# Patient Record
Sex: FEMALE | Race: WHITE | NOT HISPANIC OR LATINO | Employment: OTHER | URBAN - NONMETROPOLITAN AREA
[De-identification: names, ages, dates, MRNs, and addresses within clinical notes are randomized per-mention and may not be internally consistent; named-entity substitution may affect disease eponyms.]

---

## 2023-05-11 ENCOUNTER — APPOINTMENT (OUTPATIENT)
Dept: PRIMARY CARE | Facility: CLINIC | Age: 88
End: 2023-05-11
Payer: MEDICARE

## 2023-05-31 LAB
ANION GAP IN SER/PLAS: 12 MMOL/L (ref 10–20)
CALCIUM (MG/DL) IN SER/PLAS: 10.2 MG/DL (ref 8.6–10.3)
CARBON DIOXIDE, TOTAL (MMOL/L) IN SER/PLAS: 27 MMOL/L (ref 21–32)
CHLORIDE (MMOL/L) IN SER/PLAS: 106 MMOL/L (ref 98–107)
CHOLESTEROL (MG/DL) IN SER/PLAS: 229 MG/DL (ref 0–199)
CHOLESTEROL IN HDL (MG/DL) IN SER/PLAS: 46 MG/DL
CHOLESTEROL/HDL RATIO: 5
CREATININE (MG/DL) IN SER/PLAS: 1 MG/DL (ref 0.5–1.05)
ESTIMATED AVERAGE GLUCOSE FOR HBA1C: 114 MG/DL
GFR FEMALE: 52 ML/MIN/1.73M2
GLUCOSE (MG/DL) IN SER/PLAS: 99 MG/DL (ref 74–99)
HEMOGLOBIN A1C/HEMOGLOBIN TOTAL IN BLOOD: 5.6 %
LDL: 122 MG/DL (ref 0–99)
NON HDL CHOLESTEROL: 183 MG/DL
POTASSIUM (MMOL/L) IN SER/PLAS: 4.4 MMOL/L (ref 3.5–5.3)
SODIUM (MMOL/L) IN SER/PLAS: 141 MMOL/L (ref 136–145)
TRIGLYCERIDE (MG/DL) IN SER/PLAS: 306 MG/DL (ref 0–149)
UREA NITROGEN (MG/DL) IN SER/PLAS: 22 MG/DL (ref 6–23)
VLDL: 61 MG/DL (ref 0–40)

## 2023-06-02 ENCOUNTER — OFFICE VISIT (OUTPATIENT)
Dept: PRIMARY CARE | Facility: CLINIC | Age: 88
End: 2023-06-02
Payer: MEDICARE

## 2023-06-02 VITALS
SYSTOLIC BLOOD PRESSURE: 124 MMHG | OXYGEN SATURATION: 98 % | HEART RATE: 78 BPM | BODY MASS INDEX: 32.2 KG/M2 | DIASTOLIC BLOOD PRESSURE: 82 MMHG | HEIGHT: 60 IN | WEIGHT: 164 LBS

## 2023-06-02 DIAGNOSIS — J01.90 ACUTE NON-RECURRENT SINUSITIS, UNSPECIFIED LOCATION: Primary | ICD-10-CM

## 2023-06-02 PROCEDURE — 1159F MED LIST DOCD IN RCRD: CPT | Performed by: FAMILY MEDICINE

## 2023-06-02 PROCEDURE — 1036F TOBACCO NON-USER: CPT | Performed by: FAMILY MEDICINE

## 2023-06-02 PROCEDURE — 99213 OFFICE O/P EST LOW 20 MIN: CPT | Performed by: FAMILY MEDICINE

## 2023-06-02 PROCEDURE — 1160F RVW MEDS BY RX/DR IN RCRD: CPT | Performed by: FAMILY MEDICINE

## 2023-06-02 RX ORDER — SIMVASTATIN 40 MG/1
1 TABLET, FILM COATED ORAL DAILY
COMMUNITY
Start: 2019-12-12 | End: 2024-01-03

## 2023-06-02 RX ORDER — AMOXICILLIN 875 MG/1
875 TABLET, FILM COATED ORAL 2 TIMES DAILY
Qty: 20 TABLET | Refills: 0 | Status: SHIPPED | OUTPATIENT
Start: 2023-06-02 | End: 2023-06-12

## 2023-06-02 RX ORDER — ALLOPURINOL 100 MG/1
1 TABLET ORAL DAILY
COMMUNITY
Start: 2020-02-18 | End: 2023-08-09 | Stop reason: SDUPTHER

## 2023-06-02 RX ORDER — MULTIVITAMIN
1 TABLET ORAL DAILY
COMMUNITY
End: 2024-06-05 | Stop reason: WASHOUT

## 2023-06-02 NOTE — PATIENT INSTRUCTIONS
Will treat with antibiotics for sinus infection.  Follow up with Dr Abad next week as scheduled.

## 2023-06-02 NOTE — PROGRESS NOTES
Subjective   Kelly Borrero is a 89 y.o. female who presents for Sinusitis (Plugged head/ears/greenish/yellow discharge. Been going on for a couple weeks. Has used mucinex and coricidin. ).  Pt of Dr Abad here c/o of sinus issues for the past couple of weeks.  Congestion, drainage, discharge, OTC medications not helpful.  She states that she has sinus issues all year long but got worse a couple of weeks ago - was clear at first and started to get better but then got worse again.  Has yellow/green drainage.  Is getting worse instead of better.              Objective   Visit Vitals  /82   Pulse 78      Physical Exam  Vitals reviewed.   Constitutional:       General: She is not in acute distress.  HENT:      Right Ear: Tympanic membrane normal.      Left Ear: Tympanic membrane normal.      Mouth/Throat:      Mouth: Mucous membranes are moist.      Comments: Mild erythema, no exudate  Cardiovascular:      Rate and Rhythm: Normal rate and regular rhythm.      Heart sounds: No murmur heard.  Pulmonary:      Effort: Pulmonary effort is normal. No respiratory distress.      Breath sounds: Normal breath sounds.   Skin:     General: Skin is warm and dry.   Neurological:      General: No focal deficit present.      Mental Status: She is alert. Mental status is at baseline.         Assessment/Plan   Problem List Items Addressed This Visit    None  Visit Diagnoses       Acute non-recurrent sinusitis, unspecified location    -  Primary    Relevant Medications    amoxicillin (Amoxil) 875 mg tablet               Ale Murrieta MD

## 2023-06-07 ENCOUNTER — OFFICE VISIT (OUTPATIENT)
Dept: PRIMARY CARE | Facility: CLINIC | Age: 88
End: 2023-06-07
Payer: MEDICARE

## 2023-06-07 VITALS
OXYGEN SATURATION: 96 % | HEART RATE: 58 BPM | HEIGHT: 60 IN | WEIGHT: 163 LBS | SYSTOLIC BLOOD PRESSURE: 126 MMHG | DIASTOLIC BLOOD PRESSURE: 60 MMHG | BODY MASS INDEX: 32 KG/M2

## 2023-06-07 DIAGNOSIS — I15.9 SECONDARY HYPERTENSION: ICD-10-CM

## 2023-06-07 DIAGNOSIS — Z00.00 MEDICARE ANNUAL WELLNESS VISIT, SUBSEQUENT: Primary | ICD-10-CM

## 2023-06-07 DIAGNOSIS — N18.31 STAGE 3A CHRONIC KIDNEY DISEASE (MULTI): ICD-10-CM

## 2023-06-07 DIAGNOSIS — I70.1 RENAL ARTERY STENOSIS (CMS-HCC): ICD-10-CM

## 2023-06-07 DIAGNOSIS — E78.2 MIXED HYPERLIPIDEMIA: ICD-10-CM

## 2023-06-07 DIAGNOSIS — R73.9 HYPERGLYCEMIA: ICD-10-CM

## 2023-06-07 PROBLEM — K57.90 DIVERTICULOSIS: Status: ACTIVE | Noted: 2023-06-07

## 2023-06-07 PROBLEM — E66.9 OBESITY: Status: ACTIVE | Noted: 2023-06-07

## 2023-06-07 PROBLEM — E78.5 HYPERLIPIDEMIA: Status: ACTIVE | Noted: 2023-06-07

## 2023-06-07 PROBLEM — M1A.9XX0 GOUT, CHRONIC: Status: ACTIVE | Noted: 2023-06-07

## 2023-06-07 PROBLEM — H40.9 GLAUCOMA: Status: ACTIVE | Noted: 2023-06-07

## 2023-06-07 PROBLEM — I10 HTN (HYPERTENSION): Status: ACTIVE | Noted: 2023-06-07

## 2023-06-07 PROBLEM — K21.9 GERD (GASTROESOPHAGEAL REFLUX DISEASE): Status: ACTIVE | Noted: 2023-06-07

## 2023-06-07 PROCEDURE — 1159F MED LIST DOCD IN RCRD: CPT | Performed by: INTERNAL MEDICINE

## 2023-06-07 PROCEDURE — 1160F RVW MEDS BY RX/DR IN RCRD: CPT | Performed by: INTERNAL MEDICINE

## 2023-06-07 PROCEDURE — G0439 PPPS, SUBSEQ VISIT: HCPCS | Performed by: INTERNAL MEDICINE

## 2023-06-07 PROCEDURE — 1036F TOBACCO NON-USER: CPT | Performed by: INTERNAL MEDICINE

## 2023-06-07 PROCEDURE — 1170F FXNL STATUS ASSESSED: CPT | Performed by: INTERNAL MEDICINE

## 2023-06-07 PROCEDURE — 3078F DIAST BP <80 MM HG: CPT | Performed by: INTERNAL MEDICINE

## 2023-06-07 PROCEDURE — 99214 OFFICE O/P EST MOD 30 MIN: CPT | Performed by: INTERNAL MEDICINE

## 2023-06-07 PROCEDURE — 3074F SYST BP LT 130 MM HG: CPT | Performed by: INTERNAL MEDICINE

## 2023-06-07 ASSESSMENT — ACTIVITIES OF DAILY LIVING (ADL)
BATHING: INDEPENDENT
GROCERY_SHOPPING: INDEPENDENT
MANAGING_FINANCES: INDEPENDENT
DRESSING: INDEPENDENT
TAKING_MEDICATION: INDEPENDENT
DOING_HOUSEWORK: INDEPENDENT

## 2023-06-07 ASSESSMENT — PATIENT HEALTH QUESTIONNAIRE - PHQ9
SUM OF ALL RESPONSES TO PHQ9 QUESTIONS 1 AND 2: 0
2. FEELING DOWN, DEPRESSED OR HOPELESS: NOT AT ALL
1. LITTLE INTEREST OR PLEASURE IN DOING THINGS: NOT AT ALL

## 2023-06-07 NOTE — ASSESSMENT & PLAN NOTE
-Her cholesterol level is not ideal but I am not adding any medication at age 89 and I am not even going to put her on any severe dietary restrictions

## 2023-06-07 NOTE — PROGRESS NOTES
Pt is here today for a 6 month check up, she is also due for a [facility] wellness exam. C/O sinus congestion.

## 2023-06-07 NOTE — PATIENT INSTRUCTIONS
For your respiratory symptoms please continue taking your antibiotic as prescribed and also use your over-the-counter Flonase nasal spray.  If your condition does not resolve please get in contact with me  Overall I am pleased with your test results and I think you are doing wonderful for your age.  Please be very careful about preventing falls and use your cane at all times  If everything goes according to plan I will see you back in 6 months for reevaluation and please remember to get lab work prior to that visit

## 2023-06-07 NOTE — PROGRESS NOTES
Subjective   Reason for Visit: Kelly Borrero is an 89 y.o. female here for a Medicare Wellness visit.     Past Medical, Surgical, and Family History reviewed and updated in chart.    Reviewed all medications by prescribing practitioner or clinical pharmacist (such as prescriptions, OTCs, herbal therapies and supplements) and documented in the medical record.    HPI  She is here today for her general checkup and we also completed her annual Medicare wellness visit.  Recently she was seen during my absence for a respiratory infection and prescribed antibiotic therapy.  She states that she feels like she has pressure in her ears.  She does wear hearing aids but today she is having difficulties hearing.  I did examine her ear canals and there is no cerumen obstruction.  She states she is feeling better with the antibiotic and will take it to completion.  I also remind her to use her Flonase nasal spray.  We did conduct a review of systems and we also went through the Medicare questionnaire.  She just enjoyed her 89 birthday yesterday and is still living quite independently with her .  She reports no falls in the recent past and she has taken measures to prevent falls in her home.  Today she comes in with a cane.  She states she is not feeling depressed.  She states she feels like her memory is good for her age and in fact she feels like her health is very good for her age.  She also reports having a well-balanced diet and she does drink caffeine beverages.  She tries to stay active but does not exercise regularly.  She also has advanced directives.  We also reviewed her most recent laboratory test results and for the most part and pleased with her numbers.  We talked about her borderline elevated blood sugar and her cholesterol.  Her kidney function has remained stable.  We will make no changes in medications and if everything goes according to plan we will see her back in 6 months  Patient Care  Team:  Leslye Abad DO as PCP - General  Leslye Abad DO as PCP - [employer] PCP     Review of Systems    Objective   Vitals:  /60   Pulse 58   Ht 1.524 m (5')   Wt 73.9 kg (163 lb)   SpO2 96%   BMI 31.83 kg/m²       Physical Exam  Vitals and nursing note reviewed.   Constitutional:       General: She is not in acute distress.     Appearance: Normal appearance.   HENT:      Head: Normocephalic and atraumatic.      Comments: Very hard of hearing  Eyes:      Conjunctiva/sclera: Conjunctivae normal.   Cardiovascular:      Rate and Rhythm: Normal rate and regular rhythm.      Heart sounds: Normal heart sounds.   Pulmonary:      Effort: No respiratory distress.      Breath sounds: No wheezing.   Abdominal:      Palpations: Abdomen is soft.      Tenderness: There is no abdominal tenderness. There is no guarding.   Musculoskeletal:         General: No swelling. Normal range of motion.   Skin:     General: Skin is warm and dry.   Neurological:      General: No focal deficit present.      Mental Status: She is alert and oriented to person, place, and time.   Psychiatric:         Behavior: Behavior normal.       Recent Results (from the past 672 hour(s))   Basic Metabolic Panel    Collection Time: 05/31/23  7:41 AM   Result Value Ref Range    Glucose 99 74 - 99 mg/dL    Sodium 141 136 - 145 mmol/L    Potassium 4.4 3.5 - 5.3 mmol/L    Chloride 106 98 - 107 mmol/L    Bicarbonate 27 21 - 32 mmol/L    Anion Gap 12 10 - 20 mmol/L    Urea Nitrogen 22 6 - 23 mg/dL    Creatinine 1.00 0.50 - 1.05 mg/dL    GFR Female 52 (A) >90 mL/min/1.73m2    Calcium 10.2 8.6 - 10.3 mg/dL   Lipid Panel    Collection Time: 05/31/23  7:41 AM   Result Value Ref Range    Cholesterol 229 (H) 0 - 199 mg/dL    HDL 46.0 mg/dL    Cholesterol/HDL Ratio 5.0      (H) 0 - 99 mg/dL    VLDL 61 (H) 0 - 40 mg/dL    Triglycerides 306 (H) 0 - 149 mg/dL    Non HDL Cholesterol 183 mg/dL   Hemoglobin A1C    Collection Time:  05/31/23  7:41 AM   Result Value Ref Range    Hemoglobin A1C 5.6 %    Estimated Average Glucose 114 MG/DL         Assessment/Plan   Problem List Items Addressed This Visit          Circulatory    Renal artery stenosis (CMS/HCC)     -Stable         HTN (hypertension)     -Currently well controlled  -She is currently on no medication for blood pressure            Genitourinary    Stage 3a chronic kidney disease     -Kidney function is very stable at age 89         Relevant Orders    Follow Up In Primary Care    Basic Metabolic Panel       Other    Hyperglycemia     -Blood glucose level is borderline and we will continue to monitor         Hyperlipidemia     -Her cholesterol level is not ideal but I am not adding any medication at age 89 and I am not even going to put her on any severe dietary restrictions         Medicare annual wellness visit, subsequent - Primary

## 2023-08-09 DIAGNOSIS — M10.9 GOUT, UNSPECIFIED CAUSE, UNSPECIFIED CHRONICITY, UNSPECIFIED SITE: ICD-10-CM

## 2023-08-09 RX ORDER — ALLOPURINOL 100 MG/1
100 TABLET ORAL DAILY
Qty: 90 TABLET | Refills: 1 | Status: SHIPPED | OUTPATIENT
Start: 2023-08-09 | End: 2023-10-16

## 2023-09-18 ENCOUNTER — TELEPHONE (OUTPATIENT)
Dept: PRIMARY CARE | Facility: CLINIC | Age: 88
End: 2023-09-18
Payer: MEDICARE

## 2023-09-19 ENCOUNTER — OFFICE VISIT (OUTPATIENT)
Dept: PRIMARY CARE | Facility: CLINIC | Age: 88
End: 2023-09-19
Payer: MEDICARE

## 2023-09-19 VITALS
WEIGHT: 165 LBS | HEIGHT: 60 IN | BODY MASS INDEX: 32.39 KG/M2 | SYSTOLIC BLOOD PRESSURE: 136 MMHG | OXYGEN SATURATION: 98 % | HEART RATE: 82 BPM | DIASTOLIC BLOOD PRESSURE: 84 MMHG

## 2023-09-19 DIAGNOSIS — M62.830 SPASM OF MUSCLE OF LOWER BACK: Primary | ICD-10-CM

## 2023-09-19 PROCEDURE — 1160F RVW MEDS BY RX/DR IN RCRD: CPT | Performed by: INTERNAL MEDICINE

## 2023-09-19 PROCEDURE — 3079F DIAST BP 80-89 MM HG: CPT | Performed by: INTERNAL MEDICINE

## 2023-09-19 PROCEDURE — 3075F SYST BP GE 130 - 139MM HG: CPT | Performed by: INTERNAL MEDICINE

## 2023-09-19 PROCEDURE — 99213 OFFICE O/P EST LOW 20 MIN: CPT | Performed by: INTERNAL MEDICINE

## 2023-09-19 PROCEDURE — 1159F MED LIST DOCD IN RCRD: CPT | Performed by: INTERNAL MEDICINE

## 2023-09-19 PROCEDURE — 1036F TOBACCO NON-USER: CPT | Performed by: INTERNAL MEDICINE

## 2023-09-19 RX ORDER — TIMOLOL MALEATE 5 MG/ML
SOLUTION/ DROPS OPHTHALMIC
COMMUNITY
Start: 2023-08-15

## 2023-09-19 ASSESSMENT — ENCOUNTER SYMPTOMS
VOMITING: 0
NAUSEA: 0
COUGH: 0
SHORTNESS OF BREATH: 0
FATIGUE: 0
PALPITATIONS: 0
BLOOD IN STOOL: 0
ABDOMINAL PAIN: 0
WHEEZING: 0
BACK PAIN: 1
DIARRHEA: 0

## 2023-09-19 NOTE — PATIENT INSTRUCTIONS
Your blood pressure when you arrived today was elevated but after sitting for a while it did come down.  I got 136/84.  I am so glad that you stopped and the other day for blood pressure check as well and your blood pressure was doing well at that time.  I think that when you get upset your blood pressure goes up or when you walk in from the parking lot to the office sometimes blood pressure will go up.  We will continue to monitor  I am making no changes in your therapy and if you get your flu vaccine before we see you next time please let us know  We will see you back as previously planned

## 2023-09-19 NOTE — PROGRESS NOTES
Subjective   Patient ID: Kelly Borrero is a 89 y.o. female who presents for No chief complaint on file..  HPI  She is here today for evaluation as she had a recent visit to [facility] emergency department because of some left-sided flank pain.  She went there on the 13th and she had several tests including EKG, chest x-ray, x-ray of the ribs, and multiple laboratory test.  I was unable to access the exact results of these but she brought in a summary of her visit.  She states she was advised that everything looked fine and that she was simply suffering from back spasms.  She states she is feeling a lot better now and is doing okay.  We did conduct a review of systems.  We also discussed her blood pressure.  She states that she had called out here to have an appointment on Monday and that her  was advised that she had an appointment at 10 AM but when she got here she was advised that she did not have an appointment and while talking about it today it made her upset.  After sitting for a while her blood pressure did come down nicely.  She also stopped in the other day for a blood pressure checkup and it was fine.  I rechecked it now and it did go down.  We are making no changes in therapy because overall she seems to be getting along okay and she does have a regular visit coming up in a month or 2.  Review of Systems   Constitutional:  Negative for fatigue.   Respiratory:  Negative for cough, shortness of breath and wheezing.    Cardiovascular:  Negative for chest pain, palpitations and leg swelling.   Gastrointestinal:  Negative for abdominal pain, blood in stool, diarrhea, nausea and vomiting.   Musculoskeletal:  Positive for back pain.     Objective   Physical Exam  Vitals and nursing note reviewed.   Constitutional:       General: She is not in acute distress.     Appearance: Normal appearance.   HENT:      Head: Normocephalic and atraumatic.   Eyes:      Conjunctiva/sclera: Conjunctivae normal.    Cardiovascular:      Rate and Rhythm: Normal rate and regular rhythm.      Heart sounds: Normal heart sounds.   Pulmonary:      Effort: No respiratory distress.      Breath sounds: No wheezing.   Abdominal:      Palpations: Abdomen is soft.      Tenderness: There is no abdominal tenderness. There is no guarding.   Musculoskeletal:         General: No swelling. Normal range of motion.   Skin:     General: Skin is warm and dry.   Neurological:      General: No focal deficit present.      Mental Status: She is alert and oriented to person, place, and time.   Psychiatric:         Behavior: Behavior normal.       Assessment/Plan   Problem List Items Addressed This Visit       Spasm of muscle of lower back - Primary     -Reports feeling better now and on the way to resolution of her symptoms  -Recent ER visit at [facility] on September 13  -Several tests were performed including lab work, EKG, and x-ray of ribs as well as chest x-ray  -She will return as previously planned to go over everything else               Leslye Abad, DO

## 2023-09-19 NOTE — ASSESSMENT & PLAN NOTE
-Reports feeling better now and on the way to resolution of her symptoms  -Recent ER visit at [facility] on September 13  -Several tests were performed including lab work, EKG, and x-ray of ribs as well as chest x-ray  -She will return as previously planned to go over everything else

## 2023-10-13 DIAGNOSIS — M10.9 GOUT, UNSPECIFIED CAUSE, UNSPECIFIED CHRONICITY, UNSPECIFIED SITE: ICD-10-CM

## 2023-10-16 RX ORDER — ALLOPURINOL 100 MG/1
100 TABLET ORAL DAILY
Qty: 90 TABLET | Refills: 1 | Status: SHIPPED | OUTPATIENT
Start: 2023-10-16 | End: 2023-12-27

## 2023-11-30 ENCOUNTER — LAB (OUTPATIENT)
Dept: LAB | Facility: LAB | Age: 88
End: 2023-11-30
Payer: MEDICARE

## 2023-11-30 DIAGNOSIS — N18.31 STAGE 3A CHRONIC KIDNEY DISEASE (MULTI): ICD-10-CM

## 2023-11-30 LAB
ANION GAP SERPL CALC-SCNC: 11 MMOL/L (ref 10–20)
BUN SERPL-MCNC: 35 MG/DL (ref 6–23)
CALCIUM SERPL-MCNC: 9.8 MG/DL (ref 8.6–10.3)
CHLORIDE SERPL-SCNC: 105 MMOL/L (ref 98–107)
CO2 SERPL-SCNC: 28 MMOL/L (ref 21–32)
CREAT SERPL-MCNC: 1.12 MG/DL (ref 0.5–1.05)
GFR SERPL CREATININE-BSD FRML MDRD: 45 ML/MIN/1.73M*2
GLUCOSE SERPL-MCNC: 95 MG/DL (ref 74–99)
POTASSIUM SERPL-SCNC: 4.4 MMOL/L (ref 3.5–5.3)
SODIUM SERPL-SCNC: 140 MMOL/L (ref 136–145)

## 2023-11-30 PROCEDURE — 80048 BASIC METABOLIC PNL TOTAL CA: CPT

## 2023-11-30 PROCEDURE — 36415 COLL VENOUS BLD VENIPUNCTURE: CPT

## 2023-12-06 ENCOUNTER — OFFICE VISIT (OUTPATIENT)
Dept: PRIMARY CARE | Facility: CLINIC | Age: 88
End: 2023-12-06
Payer: MEDICARE

## 2023-12-06 VITALS
SYSTOLIC BLOOD PRESSURE: 122 MMHG | BODY MASS INDEX: 31.83 KG/M2 | DIASTOLIC BLOOD PRESSURE: 80 MMHG | HEART RATE: 76 BPM | OXYGEN SATURATION: 98 % | WEIGHT: 163 LBS

## 2023-12-06 DIAGNOSIS — E78.2 MIXED HYPERLIPIDEMIA: ICD-10-CM

## 2023-12-06 DIAGNOSIS — M1A.9XX0 CHRONIC GOUT WITHOUT TOPHUS, UNSPECIFIED CAUSE, UNSPECIFIED SITE: ICD-10-CM

## 2023-12-06 DIAGNOSIS — I15.9 SECONDARY HYPERTENSION: Primary | ICD-10-CM

## 2023-12-06 DIAGNOSIS — N18.31 STAGE 3A CHRONIC KIDNEY DISEASE (MULTI): ICD-10-CM

## 2023-12-06 PROBLEM — Z00.00 MEDICARE ANNUAL WELLNESS VISIT, SUBSEQUENT: Status: RESOLVED | Noted: 2023-06-07 | Resolved: 2023-12-06

## 2023-12-06 PROBLEM — M62.830 SPASM OF MUSCLE OF LOWER BACK: Status: RESOLVED | Noted: 2023-09-19 | Resolved: 2023-12-06

## 2023-12-06 PROCEDURE — 1160F RVW MEDS BY RX/DR IN RCRD: CPT | Performed by: INTERNAL MEDICINE

## 2023-12-06 PROCEDURE — 3074F SYST BP LT 130 MM HG: CPT | Performed by: INTERNAL MEDICINE

## 2023-12-06 PROCEDURE — 99214 OFFICE O/P EST MOD 30 MIN: CPT | Performed by: INTERNAL MEDICINE

## 2023-12-06 PROCEDURE — 1036F TOBACCO NON-USER: CPT | Performed by: INTERNAL MEDICINE

## 2023-12-06 PROCEDURE — 3079F DIAST BP 80-89 MM HG: CPT | Performed by: INTERNAL MEDICINE

## 2023-12-06 PROCEDURE — 1159F MED LIST DOCD IN RCRD: CPT | Performed by: INTERNAL MEDICINE

## 2023-12-06 ASSESSMENT — ENCOUNTER SYMPTOMS
ARTHRALGIAS: 0
ROS GI COMMENTS: OCC HEARTBURN
ABDOMINAL PAIN: 0
PALPITATIONS: 0
BACK PAIN: 0
BLOOD IN STOOL: 0
DIARRHEA: 0
FATIGUE: 1
NAUSEA: 0
SHORTNESS OF BREATH: 0
VOMITING: 0
WHEEZING: 0
COUGH: 0

## 2023-12-06 ASSESSMENT — PATIENT HEALTH QUESTIONNAIRE - PHQ9
2. FEELING DOWN, DEPRESSED OR HOPELESS: NOT AT ALL
SUM OF ALL RESPONSES TO PHQ9 QUESTIONS 1 AND 2: 0
2. FEELING DOWN, DEPRESSED OR HOPELESS: NOT AT ALL
1. LITTLE INTEREST OR PLEASURE IN DOING THINGS: NOT AT ALL
1. LITTLE INTEREST OR PLEASURE IN DOING THINGS: NOT AT ALL
SUM OF ALL RESPONSES TO PHQ9 QUESTIONS 1 AND 2: 0

## 2023-12-06 NOTE — ASSESSMENT & PLAN NOTE
-Kidney function remains stable and we will continue to monitor  -We encourage good hydration and to avoid over-the-counter NSAIDs   Yes

## 2023-12-06 NOTE — PROGRESS NOTES
Subjective   Patient ID: Kelly Borrero is a 89 y.o. female who presents for Follow-up (6 MO FUV).  HPI  She is here today for her routine next month checkup.  She is 89-1/2 years old and doing splendidly.  She is looking great and she reports feeling well with exception of occasional fatigue.  We did conduct a full review of systems and we also reviewed her medications.  We talked about refills but she indicated she has several refills ahead of her and would like to wait until its time for her to get a renewal.  We also reviewed her most recent laboratory test results talked about her fluctuating labs for her kidneys.  It appears to be stable.  We also talked about falls and she has had no falls in the last year.  She does use her cane to get around.  She is a little bit hard of hearing but otherwise I feel she is doing remarkably well.  The family goes according to plan we will see her back in 6 months for reevaluation and she knows to get lab work done prior to that visit.  Review of Systems   Constitutional:  Positive for fatigue.   Respiratory:  Negative for cough, shortness of breath and wheezing.    Cardiovascular:  Negative for chest pain, palpitations and leg swelling.   Gastrointestinal:  Negative for abdominal pain, blood in stool, diarrhea, nausea and vomiting.        Occ heartburn   Musculoskeletal:  Negative for arthralgias and back pain.     Objective   Physical Exam  Vitals and nursing note reviewed.   Constitutional:       General: She is not in acute distress.     Appearance: Normal appearance.   HENT:      Head: Normocephalic and atraumatic.   Eyes:      Conjunctiva/sclera: Conjunctivae normal.   Cardiovascular:      Rate and Rhythm: Normal rate and regular rhythm.      Heart sounds: Normal heart sounds.   Pulmonary:      Effort: No respiratory distress.      Breath sounds: No wheezing.   Abdominal:      Palpations: Abdomen is soft.      Tenderness: There is no abdominal tenderness. There is no  guarding.   Musculoskeletal:         General: No swelling. Normal range of motion.   Skin:     General: Skin is warm and dry.   Neurological:      General: No focal deficit present.      Mental Status: She is alert and oriented to person, place, and time.   Psychiatric:         Behavior: Behavior normal.       Recent Results (from the past 672 hour(s))   Basic Metabolic Panel    Collection Time: 11/30/23  7:35 AM   Result Value Ref Range    Glucose 95 74 - 99 mg/dL    Sodium 140 136 - 145 mmol/L    Potassium 4.4 3.5 - 5.3 mmol/L    Chloride 105 98 - 107 mmol/L    Bicarbonate 28 21 - 32 mmol/L    Anion Gap 11 10 - 20 mmol/L    Urea Nitrogen 35 (H) 6 - 23 mg/dL    Creatinine 1.12 (H) 0.50 - 1.05 mg/dL    eGFR 45 (L) >60 mL/min/1.73m*2    Calcium 9.8 8.6 - 10.3 mg/dL       Assessment/Plan   Problem List Items Addressed This Visit             ICD-10-CM    Stage 3a chronic kidney disease (CMS/HCC) N18.31     -Kidney function remains stable and we will continue to monitor  -We encourage good hydration and to avoid over-the-counter NSAIDs         Relevant Orders    Basic Metabolic Panel    Gout, chronic [id number].[id number]     -She has no current symptoms of gout and will continue with allopurinol 100 mg daily         HTN (hypertension) - Primary I10     -Blood pressure is excellent today and we will continue to monitor         Hyperlipidemia E78.5     -She continues to take simvastatin 40 mg daily  -We will check a fasting lipid profile just prior to her next follow-up visit         Relevant Orders    Lipid Panel          Leslye Abad DO

## 2023-12-06 NOTE — ASSESSMENT & PLAN NOTE
-She continues to take simvastatin 40 mg daily  -We will check a fasting lipid profile just prior to her next follow-up visit

## 2023-12-06 NOTE — PATIENT INSTRUCTIONS
As we discussed I am very pleased with how well you are doing at this time and please do not hesitate to reach out if you have any questions or concerns.  We would like to see you back in approximately 6 months and just prior to that visit please remember to get

## 2023-12-22 DIAGNOSIS — M10.9 GOUT, UNSPECIFIED CAUSE, UNSPECIFIED CHRONICITY, UNSPECIFIED SITE: ICD-10-CM

## 2023-12-27 RX ORDER — ALLOPURINOL 100 MG/1
100 TABLET ORAL DAILY
Qty: 100 TABLET | Refills: 2 | Status: SHIPPED | OUTPATIENT
Start: 2023-12-27

## 2024-01-03 DIAGNOSIS — E78.2 MIXED HYPERLIPIDEMIA: ICD-10-CM

## 2024-01-03 RX ORDER — SIMVASTATIN 40 MG/1
40 TABLET, FILM COATED ORAL DAILY
Qty: 90 TABLET | Refills: 3 | Status: SHIPPED | OUTPATIENT
Start: 2024-01-03 | End: 2024-06-05 | Stop reason: SDUPTHER

## 2024-06-05 ENCOUNTER — OFFICE VISIT (OUTPATIENT)
Dept: PRIMARY CARE | Facility: CLINIC | Age: 89
End: 2024-06-05
Payer: MEDICARE

## 2024-06-05 VITALS
BODY MASS INDEX: 31.8 KG/M2 | DIASTOLIC BLOOD PRESSURE: 82 MMHG | HEIGHT: 60 IN | HEART RATE: 78 BPM | WEIGHT: 162 LBS | OXYGEN SATURATION: 97 % | SYSTOLIC BLOOD PRESSURE: 126 MMHG

## 2024-06-05 DIAGNOSIS — N18.31 STAGE 3A CHRONIC KIDNEY DISEASE (MULTI): ICD-10-CM

## 2024-06-05 DIAGNOSIS — I15.9 SECONDARY HYPERTENSION: ICD-10-CM

## 2024-06-05 DIAGNOSIS — E78.2 MIXED HYPERLIPIDEMIA: ICD-10-CM

## 2024-06-05 DIAGNOSIS — I70.1 RENAL ARTERY STENOSIS (CMS-HCC): ICD-10-CM

## 2024-06-05 DIAGNOSIS — Z00.00 MEDICARE ANNUAL WELLNESS VISIT, SUBSEQUENT: Primary | ICD-10-CM

## 2024-06-05 PROCEDURE — 99213 OFFICE O/P EST LOW 20 MIN: CPT | Performed by: INTERNAL MEDICINE

## 2024-06-05 PROCEDURE — 3074F SYST BP LT 130 MM HG: CPT | Performed by: INTERNAL MEDICINE

## 2024-06-05 PROCEDURE — 3079F DIAST BP 80-89 MM HG: CPT | Performed by: INTERNAL MEDICINE

## 2024-06-05 PROCEDURE — 1124F ACP DISCUSS-NO DSCNMKR DOCD: CPT | Performed by: INTERNAL MEDICINE

## 2024-06-05 PROCEDURE — 1170F FXNL STATUS ASSESSED: CPT | Performed by: INTERNAL MEDICINE

## 2024-06-05 PROCEDURE — G0439 PPPS, SUBSEQ VISIT: HCPCS | Performed by: INTERNAL MEDICINE

## 2024-06-05 PROCEDURE — 1036F TOBACCO NON-USER: CPT | Performed by: INTERNAL MEDICINE

## 2024-06-05 PROCEDURE — 1157F ADVNC CARE PLAN IN RCRD: CPT | Performed by: INTERNAL MEDICINE

## 2024-06-05 RX ORDER — SIMVASTATIN 40 MG/1
40 TABLET, FILM COATED ORAL DAILY
Qty: 90 TABLET | Refills: 1 | Status: SHIPPED | OUTPATIENT
Start: 2024-06-05

## 2024-06-05 ASSESSMENT — PATIENT HEALTH QUESTIONNAIRE - PHQ9
SUM OF ALL RESPONSES TO PHQ9 QUESTIONS 1 AND 2: 0
1. LITTLE INTEREST OR PLEASURE IN DOING THINGS: NOT AT ALL
SUM OF ALL RESPONSES TO PHQ9 QUESTIONS 1 AND 2: 0
2. FEELING DOWN, DEPRESSED OR HOPELESS: NOT AT ALL
2. FEELING DOWN, DEPRESSED OR HOPELESS: NOT AT ALL
1. LITTLE INTEREST OR PLEASURE IN DOING THINGS: NOT AT ALL

## 2024-06-05 ASSESSMENT — ACTIVITIES OF DAILY LIVING (ADL)
GROCERY_SHOPPING: INDEPENDENT
TAKING_MEDICATION: INDEPENDENT
DOING_HOUSEWORK: INDEPENDENT
DOING_HOUSEWORK: NEEDS ASSISTANCE
DRESSING: INDEPENDENT
TAKING_MEDICATION: INDEPENDENT
MANAGING_FINANCES: INDEPENDENT
GROCERY_SHOPPING: INDEPENDENT
BATHING: INDEPENDENT
MANAGING_FINANCES: INDEPENDENT

## 2024-06-05 ASSESSMENT — ENCOUNTER SYMPTOMS
BLOOD IN STOOL: 0
DIARRHEA: 0
ABDOMINAL PAIN: 0
WHEEZING: 0
VOMITING: 0
FATIGUE: 0
BACK PAIN: 0
PALPITATIONS: 0
ARTHRALGIAS: 0
NAUSEA: 0
SHORTNESS OF BREATH: 0
COUGH: 0

## 2024-06-05 NOTE — PATIENT INSTRUCTIONS
As we discussed I would like for you to get your lab work done soon and when the results are known I will contact you  Happy birthday!!  Please call if you have any problems or concerns regarding your health  Please remember what we discussed today regarding fall prevention  Please remember to bring a copy of your living will and power of  for healthcare  If everything goes according to plan I will see you back in 6 months and please remember to get lab work done prior to that visit

## 2024-06-05 NOTE — ASSESSMENT & PLAN NOTE
-We discussed fall prevention  -We discussed looking into assisted living  -We discussed providing a copy of her living will and power of  for healthcare

## 2024-06-05 NOTE — ASSESSMENT & PLAN NOTE
-Her condition has been very stable with stable blood pressure and we will continue to monitor at age 89

## 2024-06-05 NOTE — PROGRESS NOTES
Subjective   Reason for Visit: Kelly Borrero is an 89 y.o. female here for a Medicare Wellness visit.     HPI  She is here today for her routine checkup we also took this opportunity to complete her annual Medicare wellness visit.  She states she is not feeling depressed but she states that she will be turning 89 tomorrow and her  is 89 and although she is doing well she wonders about the future.  She states that she has talked to her  about moving to assisted living but he will have nothing of it.  I told her I thought that assisted living would be a good idea just to make a smooth transition for when she needs more care.  She is highly independent and does everything for herself including her own finances.  She even cleans part of her house but has a  that comes to do other parts of her house.  She has had no falls in the last year and we talked about fall prevention.  Her memory is very good and she is very sharp today.  When we started talking she remembered her exact blood pressure from when she had originally checked in.  She tries eat a healthy diet.  She also tries to exercise but does not do it regularly.  She also has advanced directives and I remind her to bring a copy.  We did conduct a full review of systems.  We had intended for her to get lab work done prior to today's visit but she did not make it in.  She has agreed to go soon and I will contact her with the results.  We are providing a refill on her medication and if everything goes according to plan we will see her back in 6 months for reevaluation.  Patient Care Team:  Leslye Abad DO as PCP - General  Leslye Abad DO as PCP - [employer] PCP     Review of Systems   Constitutional:  Negative for fatigue.   Respiratory:  Negative for cough, shortness of breath and wheezing.    Cardiovascular:  Negative for chest pain, palpitations and leg swelling.   Gastrointestinal:  Negative for abdominal  pain, blood in stool, diarrhea, nausea and vomiting.   Musculoskeletal:  Negative for arthralgias and back pain.       Objective   Vitals:  /82   Pulse 78   Ht 1.524 m (5')   Wt 73.5 kg (162 lb)   SpO2 97%   BMI 31.64 kg/m²       Physical Exam  Vitals and nursing note reviewed.   Constitutional:       General: She is not in acute distress.     Appearance: Normal appearance.   HENT:      Head: Normocephalic and atraumatic.   Eyes:      Conjunctiva/sclera: Conjunctivae normal.   Cardiovascular:      Rate and Rhythm: Normal rate and regular rhythm.      Heart sounds: Normal heart sounds.   Pulmonary:      Effort: No respiratory distress.      Breath sounds: No wheezing.   Abdominal:      Palpations: Abdomen is soft.      Tenderness: There is no abdominal tenderness. There is no guarding.   Musculoskeletal:         General: No swelling. Normal range of motion.   Skin:     General: Skin is warm and dry.   Neurological:      General: No focal deficit present.      Mental Status: She is alert and oriented to person, place, and time.   Psychiatric:         Behavior: Behavior normal.         Assessment/Plan   Problem List Items Addressed This Visit       Renal artery stenosis (CMS-HCC)    Current Assessment & Plan     -Her condition has been very stable with stable blood pressure and we will continue to monitor at age 89         Stage 3a chronic kidney disease (Multi)    Current Assessment & Plan     -She will get lab work done soon and I have agreed to contact her with results         HTN (hypertension)    Current Assessment & Plan     -Currently well-controlled and she will continue with healthy dietary practices         Relevant Orders    Basic Metabolic Panel    Hyperlipidemia    Current Assessment & Plan     -We will call her with the results of her lipid profile once they are complete         Relevant Medications    simvastatin (Zocor) 40 mg tablet    Medicare annual wellness visit, subsequent - Primary     Current Assessment & Plan     -We discussed fall prevention  -We discussed looking into assisted living  -We discussed providing a copy of her living will and power of  for healthcare

## 2024-06-10 ENCOUNTER — LAB (OUTPATIENT)
Dept: LAB | Facility: LAB | Age: 89
End: 2024-06-10
Payer: MEDICARE

## 2024-06-10 DIAGNOSIS — E78.2 MIXED HYPERLIPIDEMIA: ICD-10-CM

## 2024-06-10 DIAGNOSIS — N18.31 STAGE 3A CHRONIC KIDNEY DISEASE (MULTI): ICD-10-CM

## 2024-06-10 LAB
ANION GAP SERPL CALC-SCNC: 10 MMOL/L (ref 10–20)
BUN SERPL-MCNC: 30 MG/DL (ref 6–23)
CALCIUM SERPL-MCNC: 9.5 MG/DL (ref 8.6–10.3)
CHLORIDE SERPL-SCNC: 107 MMOL/L (ref 98–107)
CHOLEST SERPL-MCNC: 236 MG/DL (ref 0–199)
CHOLESTEROL/HDL RATIO: 5.5
CO2 SERPL-SCNC: 27 MMOL/L (ref 21–32)
CREAT SERPL-MCNC: 1.11 MG/DL (ref 0.5–1.05)
EGFRCR SERPLBLD CKD-EPI 2021: 45 ML/MIN/1.73M*2
GLUCOSE SERPL-MCNC: 99 MG/DL (ref 74–99)
HDLC SERPL-MCNC: 43 MG/DL
LDLC SERPL CALC-MCNC: 131 MG/DL
NON HDL CHOLESTEROL: 193 MG/DL (ref 0–149)
POTASSIUM SERPL-SCNC: 4.4 MMOL/L (ref 3.5–5.3)
SODIUM SERPL-SCNC: 140 MMOL/L (ref 136–145)
TRIGL SERPL-MCNC: 310 MG/DL (ref 0–149)
VLDL: 62 MG/DL (ref 0–40)

## 2024-06-10 PROCEDURE — 80048 BASIC METABOLIC PNL TOTAL CA: CPT

## 2024-06-10 PROCEDURE — 36415 COLL VENOUS BLD VENIPUNCTURE: CPT

## 2024-06-10 PROCEDURE — 80061 LIPID PANEL: CPT

## 2024-10-01 DIAGNOSIS — M10.9 GOUT, UNSPECIFIED CAUSE, UNSPECIFIED CHRONICITY, UNSPECIFIED SITE: ICD-10-CM

## 2024-10-01 DIAGNOSIS — E78.2 MIXED HYPERLIPIDEMIA: ICD-10-CM

## 2024-10-02 RX ORDER — ALLOPURINOL 100 MG/1
100 TABLET ORAL DAILY
Qty: 100 TABLET | Refills: 0 | Status: SHIPPED | OUTPATIENT
Start: 2024-10-02

## 2024-10-02 RX ORDER — SIMVASTATIN 40 MG/1
40 TABLET, FILM COATED ORAL DAILY
Qty: 100 TABLET | Refills: 0 | Status: SHIPPED | OUTPATIENT
Start: 2024-10-02

## 2024-11-27 ENCOUNTER — LAB (OUTPATIENT)
Dept: LAB | Facility: LAB | Age: 89
End: 2024-11-27
Payer: MEDICARE

## 2024-11-27 DIAGNOSIS — I15.9 SECONDARY HYPERTENSION: ICD-10-CM

## 2024-11-27 LAB
ANION GAP SERPL CALC-SCNC: 11 MMOL/L (ref 10–20)
BUN SERPL-MCNC: 33 MG/DL (ref 6–23)
CALCIUM SERPL-MCNC: 9.8 MG/DL (ref 8.6–10.3)
CHLORIDE SERPL-SCNC: 109 MMOL/L (ref 98–107)
CO2 SERPL-SCNC: 26 MMOL/L (ref 21–32)
CREAT SERPL-MCNC: 1.09 MG/DL (ref 0.5–1.05)
EGFRCR SERPLBLD CKD-EPI 2021: 46 ML/MIN/1.73M*2
GLUCOSE SERPL-MCNC: 100 MG/DL (ref 74–99)
POTASSIUM SERPL-SCNC: 4.3 MMOL/L (ref 3.5–5.3)
SODIUM SERPL-SCNC: 142 MMOL/L (ref 136–145)

## 2024-11-27 PROCEDURE — 80048 BASIC METABOLIC PNL TOTAL CA: CPT

## 2024-11-27 PROCEDURE — 36415 COLL VENOUS BLD VENIPUNCTURE: CPT

## 2024-12-05 ENCOUNTER — APPOINTMENT (OUTPATIENT)
Dept: PRIMARY CARE | Facility: CLINIC | Age: 89
End: 2024-12-05
Payer: MEDICARE

## 2024-12-05 VITALS
HEART RATE: 76 BPM | DIASTOLIC BLOOD PRESSURE: 72 MMHG | WEIGHT: 163.8 LBS | BODY MASS INDEX: 32.16 KG/M2 | HEIGHT: 60 IN | OXYGEN SATURATION: 96 % | SYSTOLIC BLOOD PRESSURE: 124 MMHG

## 2024-12-05 DIAGNOSIS — I15.9 SECONDARY HYPERTENSION: Primary | ICD-10-CM

## 2024-12-05 DIAGNOSIS — M10.9 GOUT, UNSPECIFIED CAUSE, UNSPECIFIED CHRONICITY, UNSPECIFIED SITE: ICD-10-CM

## 2024-12-05 DIAGNOSIS — N18.31 STAGE 3A CHRONIC KIDNEY DISEASE (MULTI): ICD-10-CM

## 2024-12-05 DIAGNOSIS — M48.061 SPINAL STENOSIS OF LUMBAR REGION WITHOUT NEUROGENIC CLAUDICATION: ICD-10-CM

## 2024-12-05 DIAGNOSIS — M1A.9XX0 CHRONIC GOUT WITHOUT TOPHUS, UNSPECIFIED CAUSE, UNSPECIFIED SITE: ICD-10-CM

## 2024-12-05 DIAGNOSIS — R73.9 HYPERGLYCEMIA: ICD-10-CM

## 2024-12-05 DIAGNOSIS — E78.2 MIXED HYPERLIPIDEMIA: ICD-10-CM

## 2024-12-05 PROBLEM — Z00.00 MEDICARE ANNUAL WELLNESS VISIT, SUBSEQUENT: Status: RESOLVED | Noted: 2023-06-07 | Resolved: 2024-12-05

## 2024-12-05 PROCEDURE — 1036F TOBACCO NON-USER: CPT | Performed by: INTERNAL MEDICINE

## 2024-12-05 PROCEDURE — 1160F RVW MEDS BY RX/DR IN RCRD: CPT | Performed by: INTERNAL MEDICINE

## 2024-12-05 PROCEDURE — 3078F DIAST BP <80 MM HG: CPT | Performed by: INTERNAL MEDICINE

## 2024-12-05 PROCEDURE — 1157F ADVNC CARE PLAN IN RCRD: CPT | Performed by: INTERNAL MEDICINE

## 2024-12-05 PROCEDURE — 3074F SYST BP LT 130 MM HG: CPT | Performed by: INTERNAL MEDICINE

## 2024-12-05 PROCEDURE — 1159F MED LIST DOCD IN RCRD: CPT | Performed by: INTERNAL MEDICINE

## 2024-12-05 PROCEDURE — 99214 OFFICE O/P EST MOD 30 MIN: CPT | Performed by: INTERNAL MEDICINE

## 2024-12-05 RX ORDER — ALLOPURINOL 100 MG/1
100 TABLET ORAL DAILY
Qty: 100 TABLET | Refills: 1 | Status: SHIPPED | OUTPATIENT
Start: 2024-12-05

## 2024-12-05 RX ORDER — SIMVASTATIN 40 MG/1
40 TABLET, FILM COATED ORAL DAILY
Qty: 100 TABLET | Refills: 1 | Status: SHIPPED | OUTPATIENT
Start: 2024-12-05

## 2024-12-05 ASSESSMENT — ENCOUNTER SYMPTOMS
BACK PAIN: 0
ABDOMINAL PAIN: 0
ARTHRALGIAS: 0
WHEEZING: 0
FATIGUE: 0
UNEXPECTED WEIGHT CHANGE: 0
COUGH: 0
BLOOD IN STOOL: 0
CHEST TIGHTNESS: 0
PALPITATIONS: 0
NAUSEA: 0
SHORTNESS OF BREATH: 0
DIARRHEA: 0
VOMITING: 0

## 2024-12-05 NOTE — ASSESSMENT & PLAN NOTE
-Her blood pressure remains under great control so we will continue with her current medical regimen

## 2024-12-05 NOTE — PROGRESS NOTES
Subjective   Patient ID: Kelly Borrero is a 89 y.o. female who presents for Follow-up (6 MO FU ).  HPI  She is here today for her 6-month check up.  She is looking well and also reports feeling well.  She is needing a prescription for a new disability placard which we will give to her today.  We did conduct a full review of systems and we also went over the results of recent lab work.  Overall I am pleased with her numbers.  Her kidney function remains stable.  Her blood glucose is only slightly raised.  Her blood pressure is excellent today.  She states that overall she feels like she is doing well.  I will summarize everything in a problem based format  Review of Systems   Constitutional:  Negative for fatigue and unexpected weight change.   Respiratory:  Negative for cough, chest tightness, shortness of breath and wheezing.    Cardiovascular:  Negative for chest pain, palpitations and leg swelling.   Gastrointestinal:  Negative for abdominal pain, blood in stool, diarrhea, nausea and vomiting.   Musculoskeletal:  Negative for arthralgias and back pain.     Objective   Physical Exam  Vitals and nursing note reviewed.   Constitutional:       General: She is not in acute distress.     Appearance: Normal appearance.   HENT:      Head: Normocephalic and atraumatic.   Eyes:      Conjunctiva/sclera: Conjunctivae normal.   Cardiovascular:      Rate and Rhythm: Normal rate and regular rhythm.      Heart sounds: Normal heart sounds.   Pulmonary:      Effort: No respiratory distress.      Breath sounds: No wheezing.   Abdominal:      Palpations: Abdomen is soft.      Tenderness: There is no abdominal tenderness. There is no guarding.   Musculoskeletal:         General: No swelling. Normal range of motion.   Skin:     General: Skin is warm and dry.   Neurological:      General: No focal deficit present.      Mental Status: She is alert and oriented to person, place, and time.   Psychiatric:         Behavior: Behavior  normal.       Recent Results (from the past 4 weeks)   Basic Metabolic Panel    Collection Time: 11/27/24  7:36 AM   Result Value Ref Range    Glucose 100 (H) 74 - 99 mg/dL    Sodium 142 136 - 145 mmol/L    Potassium 4.3 3.5 - 5.3 mmol/L    Chloride 109 (H) 98 - 107 mmol/L    Bicarbonate 26 21 - 32 mmol/L    Anion Gap 11 10 - 20 mmol/L    Urea Nitrogen 33 (H) 6 - 23 mg/dL    Creatinine 1.09 (H) 0.50 - 1.05 mg/dL    eGFR 46 (L) >60 mL/min/1.73m*2    Calcium 9.8 8.6 - 10.3 mg/dL       Assessment/Plan   Problem List Items Addressed This Visit             ICD-10-CM    Stage 3a chronic kidney disease (Multi) N18.31     -Kidney function is stable and we will continue to monitor         Gout, chronic [id number].[id number]     -Her condition is well-controlled on her current medical regimen and we are providing refills today         HTN (hypertension) - Primary I10     -Her blood pressure remains under great control so we will continue with her current medical regimen         Relevant Orders    Basic Metabolic Panel    Hyperglycemia R73.9     -Fasting glucoses mildly elevated and we will continue to monitor         Hyperlipidemia E78.5     -We will be checking a lipid profile just prior to her next follow-up visit         Relevant Medications    simvastatin (Zocor) 40 mg tablet    Other Relevant Orders    Lipid Panel    Spinal stenosis of lumbar region M48.061     -I am giving her a prescription for the disability placard for 5 years         Relevant Orders    Disability Placard     Other Visit Diagnoses         Codes    Gout, unspecified cause, unspecified chronicity, unspecified site     M10.9    Relevant Medications    allopurinol (Zyloprim) 100 mg tablet        Patient instructions  As we discussed the staff will give you the prescription today for the disability placard and you will take that with your application to the [facility].  Please call if you have any problems  I also sent refills for your medicines and please let us know if  there are any issues  I am very pleased with your checkup today and we invite you to return in 6 months for another checkup.  Please remember to get fasting lab work done prior to that visit       Leslye Abad, DO

## 2024-12-05 NOTE — ASSESSMENT & PLAN NOTE
-Her condition is well-controlled on her current medical regimen and we are providing refills today

## 2024-12-05 NOTE — PATIENT INSTRUCTIONS
As we discussed the staff will give you the prescription today for the disability placard and you will take that with your application to the [facility].  Please call if you have any problems  I also sent refills for your medicines and please let us know if there are any issues  I am very pleased with your checkup today and we invite you to return in 6 months for another checkup.  Please remember to get fasting lab work done prior to that visit

## 2025-05-21 NOTE — ASSESSMENT & PLAN NOTE
-We will call her with the results of her lipid profile once they are complete   Consent: The patient's consent was obtained including but not limited to risks of crusting, scabbing, blistering, scarring, darker or lighter pigmentary change, recurrence, incomplete removal and infection. Render Post-Care Instructions In Note?: no Duration Of Freeze Thaw-Cycle (Seconds): 5 Post-Care Instructions: I reviewed with the patient in detail post-care instructions. Patient is to wear sunprotection, and avoid picking at any of the treated lesions. Pt may apply Vaseline to crusted or scabbing areas. Show Applicator Variable?: Yes Detail Level: Detailed

## 2025-05-30 LAB
ANION GAP SERPL CALCULATED.4IONS-SCNC: 11 MMOL/L (CALC) (ref 7–17)
BUN SERPL-MCNC: 30 MG/DL (ref 7–25)
BUN/CREAT SERPL: 23 (CALC) (ref 6–22)
CALCIUM SERPL-MCNC: 9.8 MG/DL (ref 8.6–10.4)
CHLORIDE SERPL-SCNC: 105 MMOL/L (ref 98–110)
CHOLEST SERPL-MCNC: 252 MG/DL
CHOLEST/HDLC SERPL: 5.4 (CALC)
CO2 SERPL-SCNC: 25 MMOL/L (ref 20–32)
CREAT SERPL-MCNC: 1.29 MG/DL (ref 0.6–0.95)
EGFRCR SERPLBLD CKD-EPI 2021: 38 ML/MIN/1.73M2
GLUCOSE SERPL-MCNC: 99 MG/DL (ref 65–99)
HDLC SERPL-MCNC: 47 MG/DL
LDLC SERPL CALC-MCNC: 153 MG/DL (CALC)
NONHDLC SERPL-MCNC: 205 MG/DL (CALC)
POTASSIUM SERPL-SCNC: 4.9 MMOL/L (ref 3.5–5.3)
SODIUM SERPL-SCNC: 141 MMOL/L (ref 135–146)
TRIGL SERPL-MCNC: 338 MG/DL

## 2025-06-05 ENCOUNTER — APPOINTMENT (OUTPATIENT)
Age: OVER 89
End: 2025-06-05
Payer: MEDICARE

## 2025-06-05 VITALS
SYSTOLIC BLOOD PRESSURE: 130 MMHG | DIASTOLIC BLOOD PRESSURE: 74 MMHG | HEART RATE: 78 BPM | BODY MASS INDEX: 32.31 KG/M2 | OXYGEN SATURATION: 97 % | HEIGHT: 60 IN | WEIGHT: 164.6 LBS

## 2025-06-05 DIAGNOSIS — M1A.9XX0 CHRONIC GOUT WITHOUT TOPHUS, UNSPECIFIED CAUSE, UNSPECIFIED SITE: ICD-10-CM

## 2025-06-05 DIAGNOSIS — Z00.00 MEDICARE ANNUAL WELLNESS VISIT, SUBSEQUENT: Primary | ICD-10-CM

## 2025-06-05 DIAGNOSIS — E78.2 MIXED HYPERLIPIDEMIA: ICD-10-CM

## 2025-06-05 DIAGNOSIS — H91.90 HOH (HARD OF HEARING): ICD-10-CM

## 2025-06-05 DIAGNOSIS — N18.31 STAGE 3A CHRONIC KIDNEY DISEASE (MULTI): ICD-10-CM

## 2025-06-05 PROBLEM — I10 HTN (HYPERTENSION): Status: RESOLVED | Noted: 2023-06-07 | Resolved: 2025-06-05

## 2025-06-05 ASSESSMENT — ENCOUNTER SYMPTOMS
VOMITING: 0
NAUSEA: 0
COUGH: 0
ARTHRALGIAS: 0
DIARRHEA: 0
WHEEZING: 0
BACK PAIN: 0
ABDOMINAL PAIN: 0
PALPITATIONS: 0
FATIGUE: 1
BLOOD IN STOOL: 0
SHORTNESS OF BREATH: 0

## 2025-06-05 ASSESSMENT — ACTIVITIES OF DAILY LIVING (ADL)
DRESSING: INDEPENDENT
BATHING: INDEPENDENT
DOING_HOUSEWORK: NEEDS ASSISTANCE
GROCERY_SHOPPING: INDEPENDENT
TAKING_MEDICATION: INDEPENDENT
MANAGING_FINANCES: INDEPENDENT
ADLS_COMMENTS: USES CANE

## 2025-06-05 ASSESSMENT — PATIENT HEALTH QUESTIONNAIRE - PHQ9
SUM OF ALL RESPONSES TO PHQ9 QUESTIONS 1 AND 2: 0
1. LITTLE INTEREST OR PLEASURE IN DOING THINGS: NOT AT ALL
2. FEELING DOWN, DEPRESSED OR HOPELESS: NOT AT ALL

## 2025-06-05 NOTE — PATIENT INSTRUCTIONS
Patient instructions  As we discussed I think it would be a wise idea for you and your  to look into assisted living arrangements.  There are several wonderful places here in [place] that you could moved to.  I would recommend calling the facility and making an appointment with a financial counselor.  They will be able to answer all of your questions about financing at [facility].  It is always a good idea to get settled before something happens in the future.  Please call me if you have any questions or concerns  I did not send refills since you say you do not need them at this time and I would like to see you back in 6 months for another evaluation but sooner if any problems  Please try to increase your water intake

## 2025-06-05 NOTE — ASSESSMENT & PLAN NOTE
- Her gout symptoms have been stable and she states she has plenty of medication at this time       Patient instructions  As we discussed I think it would be a wise idea for you and your  to look into assisted living arrangements.  There are several wonderful places here in [place] that you could moved to.  I would recommend calling the facility and making an appointment with a financial counselor.  They will be able to answer all of your questions about financing at [facility].  It is always a good idea to get settled before something happens in the future.  Please call me if you have any questions or concerns  I did not send refills since you say you do not need them at this time and I would like to see you back in 6 months for another evaluation but sooner if any problems  Please try to increase your water intake

## 2025-06-05 NOTE — PROGRESS NOTES
Subjective   Reason for Visit: Kelly Borrero is an 89 y.o. female here for a Medicare Wellness visit.     Past Medical, Surgical, and Family History reviewed and updated in chart.    Reviewed all medications by prescribing practitioner or clinical pharmacist (such as prescriptions, OTCs, herbal therapies and supplements) and documented in the medical record.    HPI  She is here today for her routine checkup.  We also took this opportunity to complete her annual Medicare wellness visit.  She will be turning 89 tomorrow.  She still lives independently with her  who just turned 90.  She states she feels like she is slowing down and she does sometimes worry about her future.  We talked about looking into assisted living and she states she would consider it but her  would not.  She does not have any children of her own but she does have some stepsons.  She states that 1 named Nabeel helps a lot.  I told her that maybe she would talk to him about looking into assisted living in [place].  She states she does drive occasionally and drove to today's appointment.  She states she only takes short trips and she has had no issues.  She continues to manage her own finances and her own medications.  She also cooks and cleans although she hires out vacuuming.  She denies any falls in the recent past.  We talked about fall prevention and she does have grab bars in the bathroom.  She does have problems with her hearing and has several hearing aids sets.  She is wearing 1 today.  She is very active.  She does have advanced directives.  We also conducted a full review of systems and went over the results of recent lab work.  Her kidney function is relatively stable.  Her cholesterol profile is higher than desirable and she states she does not always take her cholesterol medication regularly.  I am making no changes in therapy and if everything goes according to plan we will see her back in 6 months for  reevaluation.  Patient Care Team:  Leslye Abad DO as PCP - General  Leslye Abad DO as PCP - [employer] PCP     Review of Systems   Constitutional:  Positive for fatigue.   Respiratory:  Negative for cough, shortness of breath and wheezing.    Cardiovascular:  Negative for chest pain, palpitations and leg swelling.   Gastrointestinal:  Negative for abdominal pain, blood in stool, diarrhea, nausea and vomiting.   Musculoskeletal:  Negative for arthralgias and back pain.       Objective   Vitals:  /74   Pulse 78   Ht 1.524 m (5')   Wt 74.7 kg (164 lb 9.6 oz)   SpO2 97%   BMI 32.15 kg/m²       Physical Exam  Vitals and nursing note reviewed.   Constitutional:       General: She is not in acute distress.     Appearance: Normal appearance.   HENT:      Head: Normocephalic and atraumatic.   Eyes:      Conjunctiva/sclera: Conjunctivae normal.   Cardiovascular:      Rate and Rhythm: Normal rate and regular rhythm.      Heart sounds: Normal heart sounds.   Pulmonary:      Effort: No respiratory distress.      Breath sounds: No wheezing.   Abdominal:      Palpations: Abdomen is soft.      Tenderness: There is no abdominal tenderness. There is no guarding.   Musculoskeletal:         General: No swelling. Normal range of motion.   Skin:     General: Skin is warm and dry.   Neurological:      General: No focal deficit present.      Mental Status: She is alert and oriented to person, place, and time.   Psychiatric:         Behavior: Behavior normal.       Recent Results (from the past 6 weeks)   Lipid Panel    Collection Time: 05/29/25  7:38 AM   Result Value Ref Range    CHOLESTEROL, TOTAL 252 (H) <200 mg/dL    HDL CHOLESTEROL 47 (L) > OR = 50 mg/dL    TRIGLYCERIDES 338 (H) <150 mg/dL    LDL-CHOLESTEROL 153 (H) mg/dL (calc)    CHOL/HDLC RATIO 5.4 (H) <5.0 (calc)    NON HDL CHOLESTEROL 205 (H) <130 mg/dL (calc)   Basic Metabolic Panel    Collection Time: 05/29/25  7:38 AM   Result Value Ref  "Range    GLUCOSE 99 65 - 99 mg/dL    UREA NITROGEN (BUN) 30 (H) 7 - 25 mg/dL    CREATININE 1.29 (H) 0.60 - 0.95 mg/dL    EGFR 38 (L) > OR = 60 mL/min/1.73m2    BUN/CREATININE RATIO 23 (H) 6 - 22 (calc)    SODIUM 141 135 - 146 mmol/L    POTASSIUM 4.9 3.5 - 5.3 mmol/L    CHLORIDE 105 98 - 110 mmol/L    CARBON DIOXIDE 25 20 - 32 mmol/L    ELECTROLYTE BALANCE 11 7 - 17 mmol/L (calc)    CALCIUM 9.8 8.6 - 10.4 mg/dL       Assessment & Plan  Medicare annual wellness visit, subsequent  - We discussed fall prevention  -We discussed healthy diet with exercise  -She has advanced directives  -We discussed looking into assisted living before \"a crisis happens \".         Mixed hyperlipidemia  - Her cholesterol numbers are higher than desirable and we talked about taking the cholesterol medicine more consistently.  I am not going to push things since she is 89 and doing very well         Stage 3a chronic kidney disease (Multi)  - Stable at this time we will continue to monitor    Orders:    Basic Metabolic Panel; Future    [place] (hard of hearing)  - She does have prescription hearing aids but unfortunately they can only do so much         Chronic gout without tophus, unspecified cause, unspecified site  - Her gout symptoms have been stable and she states she has plenty of medication at this time       Patient instructions  As we discussed I think it would be a wise idea for you and your  to look into assisted living arrangements.  There are several wonderful places here in [place] that you could moved to.  I would recommend calling the facility and making an appointment with a financial counselor.  They will be able to answer all of your questions about financing at [facility].  It is always a good idea to get settled before something happens in the future.  Please call me if you have any questions or concerns  I did not send refills since you say you do not need them at this time and I would like to see you back in 6 months for " another evaluation but sooner if any problems  Please try to increase your water intake

## 2025-06-05 NOTE — ASSESSMENT & PLAN NOTE
"- We discussed fall prevention  -We discussed healthy diet with exercise  -She has advanced directives  -We discussed looking into assisted living before \"a crisis happens \".         "

## 2025-06-05 NOTE — ASSESSMENT & PLAN NOTE
- Her cholesterol numbers are higher than desirable and we talked about taking the cholesterol medicine more consistently.  I am not going to push things since she is 89 and doing very well

## 2025-12-11 ENCOUNTER — APPOINTMENT (OUTPATIENT)
Age: OVER 89
End: 2025-12-11
Payer: MEDICARE